# Patient Record
Sex: MALE | Race: WHITE | NOT HISPANIC OR LATINO
[De-identification: names, ages, dates, MRNs, and addresses within clinical notes are randomized per-mention and may not be internally consistent; named-entity substitution may affect disease eponyms.]

---

## 2023-01-24 ENCOUNTER — TRANSCRIBE ORDERS (OUTPATIENT)
Dept: OTHER | Age: 73
End: 2023-01-24

## 2023-01-24 DIAGNOSIS — Z98.890 S/P ARTHROSCOPY OF LEFT SHOULDER: ICD-10-CM

## 2023-01-24 DIAGNOSIS — M19.011 OSTEOARTHRITIS OF RIGHT GLENOHUMERAL JOINT: Primary | ICD-10-CM

## 2023-01-24 DIAGNOSIS — Z98.890 S/P ARTHROSCOPY OF RIGHT SHOULDER: ICD-10-CM

## 2023-02-09 ENCOUNTER — THERAPY VISIT (OUTPATIENT)
Dept: PHYSICAL THERAPY | Facility: CLINIC | Age: 73
End: 2023-02-09
Attending: ORTHOPAEDIC SURGERY
Payer: MEDICARE

## 2023-02-09 DIAGNOSIS — M25.511 CHRONIC RIGHT SHOULDER PAIN: ICD-10-CM

## 2023-02-09 DIAGNOSIS — G89.29 CHRONIC RIGHT SHOULDER PAIN: ICD-10-CM

## 2023-02-09 DIAGNOSIS — Z98.890 S/P ARTHROSCOPY OF RIGHT SHOULDER: ICD-10-CM

## 2023-02-09 DIAGNOSIS — Z98.890 S/P ARTHROSCOPY OF LEFT SHOULDER: ICD-10-CM

## 2023-02-09 DIAGNOSIS — M25.511 ACUTE PAIN OF RIGHT SHOULDER: ICD-10-CM

## 2023-02-09 DIAGNOSIS — M19.011 OSTEOARTHRITIS OF RIGHT GLENOHUMERAL JOINT: Primary | ICD-10-CM

## 2023-02-09 PROCEDURE — 97161 PT EVAL LOW COMPLEX 20 MIN: CPT | Mod: GP | Performed by: PHYSICAL THERAPIST

## 2023-02-09 PROCEDURE — 97110 THERAPEUTIC EXERCISES: CPT | Mod: GP | Performed by: PHYSICAL THERAPIST

## 2023-02-09 NOTE — PROGRESS NOTES
Physical Therapy Initial Evaluation  Subjective:  Pt reports he has been having some pain in his R shoulder over the last couple of months that was new to his R arm and he began to have more pain after he did some shoveling and roof raking. He went to see Dr. Garcia's team and had a cortisone injection at [facility] after his visit with Chidi Gonzáles on 11-10-22. He f/u with Dr. Garcia on 1-23-23 and he recommended 6 visits of therapy as well. PMH: R shoulder RCR in 2008 and L in 2012    The history is provided by the patient. No  was used.   Therapist Generated HPI Evaluation         Type of problem:  Right shoulder.    This is a new condition.  Condition occurred with:  Repetition/overuse and unknown cause.  Where condition occurred: for unknown reasons.  Patient reports pain:  Lateral, anterior and in the joint.  Pain is described as aching and is intermittent.  Pain radiates to:  Shoulder. Pain is worse in the A.M..  Since onset symptoms are gradually improving (since the injection).  Associated symptoms:  Loss of motion/stiffness, loss of strength and painful arc. Symptoms are exacerbated by carrying, lifting, lying on extremity, using arm behind back, using arm overhead and using arm at shoulder level  and relieved by rest, NSAID's and ice.  Special tests included:  X-ray.  Previous treatment includes surgery and physical therapy. There was significant improvement following previous treatment.  Barriers include:  None as reported by patient.    Patient Health History         Pain is reported as 2/10 on pain scale.    Pertinent medical history includes: history of fractures, osteoarthritis, smoking and osteoporosis (osteopenia, thumb and rib fractures, knees and hips replaced, ankle and femur fractures and compression fractures in spine).   Red flags:  None as reported by patient.   Other medical allergies details: cetadine, cephadrioxil, cephalexin, clindamiacin.   Surgeries include:   Orthopedic surgery. Other surgery history details: R REYES, TKA and L TKA and insufficiency fracture and had a REYES hip revision on R and 2 cuff repairs then had a L REYES in 2021.    Current medications: nexium, flowmax, finasteride, nasonex.    Current occupation is retired.                                       Objective:  Standing Alignment:      Shoulder/UE:  Protracted scapula R, elevated scapula R and scapular winging R                                       Shoulder Evaluation:  ROM:  AROM:    Flexion:  Left:  150    Right:  145 with pain 5/10    Abduction:  Left: 155   Right:  145 with 7/10 pain      External Rotation:  Left:  50    Right:  55 with pain 5/10            Extension/Internal Rotation:  Left:  T9    Right:  T10          Strength:  : R shoulder: 4/4-/4+/4- all with pain but ABD and ER were the worst (6/10) and L shouldeR: 4+/4+/5/4+                        Special Tests:      Right shoulder positive for the following special tests:Impingement  Palpation:    Left shoulder tenderness present at:  Upper Trap  Right shoulder tenderness present at: Levator; Rhomboids and Upper Trap  Mobility Tests:              Sternoclavicular right:  Hypomobile                                     General     ROS    Assessment/Plan:    Patient is a 72 year old male with right side shoulder complaints.    Patient has the following significant findings with corresponding treatment plan.                Diagnosis 1:  R shoulder impingement    Pain -  manual therapy, self management, education, directional preference exercise and home program  Decreased ROM/flexibility - manual therapy and therapeutic exercise  Decreased joint mobility - manual therapy and therapeutic exercise  Decreased strength - therapeutic exercise and therapeutic activities  Impaired muscle performance - neuro re-education  Decreased function - therapeutic activities    Therapy Evaluation Codes:     1) Clinical presentation characteristics  are:   Stable/Uncomplicated.  2) Decision-Making    Low complexity using standardized patient assessment instrument and/or measureable assessment of functional outcome.  Cumulative Therapy Evaluation is: Low complexity.    Previous and current functional limitations:  (See Goal Flow Sheet for this information)    Short term and Long term goals: (See Goal Flow Sheet for this information)     Communication ability:  Patient appears to be able to clearly communicate and understand verbal and written communication and follow directions correctly.  Treatment Explanation - The following has been discussed with the patient:   RX ordered/plan of care  Anticipated outcomes  Possible risks and side effects  This patient would benefit from PT intervention to resume normal activities.   Rehab potential is good.    Frequency:  2 X month, once daily  Duration:  for 3 months  Discharge Plan:  Achieve all LTG.  Independent in home treatment program.  Reach maximal therapeutic benefit.    Please refer to the daily flowsheet for treatment today, total treatment time and time spent performing 1:1 timed codes.

## 2023-02-09 NOTE — PROGRESS NOTES
[facility]    OUTPATIENT Physical Therapy ORTHOPEDIC EVALUATION  PLAN OF TREATMENT FOR OUTPATIENT REHABILITATION  (COMPLETE FOR INITIAL CLAIMS ONLY)  Patient's Last Name, First Name, M.I.  YOB: 1950  Javon Morris    Provider s Name:  [facility]   Medical Record No.  [id number]   Start of Care Date:  02/09/23   Onset Date:    (MD appt on 1-23-23)   Treatment Diagnosis:  R shoulder impingment Medical Diagnosis:     Osteoarthritis of right glenohumeral joint  S/P arthroscopy of right shoulder  S/P arthroscopy of left shoulder       Goals:     02/09/23 0500   Body Part   Goals listed below are for R shoulder   Goal #1   Goal #1 reaching   Previous Functional Level No restrictions   Current Functional Level Can reach ;behind back;overhead;out to the side;across body;to shoulder level   Performance level pain 5-7/10 pain at end range and painful arc   STG Target Performance Reach ;behind back;to counter height;to shoulder level;overhead;out to the side   Performance level no painful arc and pain 2-3/10 or less at end range   Rationale for independent personal hygiene;for dressing;for bathing;for safe driving, hook seat belt, reach shift lever and turn signal, using both hands on steering wheel   Due date 03/09/23   LTG Target Performance Reach;to counter height;to shoulder level;overhead;behind back;out to the side   Performance Level pain free at end range and no painful arc   Rationale for independent personal hygiene;for dressing;for meal preparation;for bathing;for safe driving, hook seat belt, reach shift lever and turn signal, using both hands on steering wheel   Due date 05/04/23         Therapy Frequency:  2x/month  Predicted Duration of Therapy Intervention:  3 months    Sonia Garcia DPT                 I CERTIFY THE NEED FOR THESE SERVICES  FURNISHED UNDER        THIS PLAN OF TREATMENT AND WHILE UNDER MY CARE .             Physician Signature               Date    X_____________________________________________________                         Certification Date From:  02/09/23   Certification Date To:  05/04/23    Referring Provider:  Kar Garcia    Initial Assessment        See Epic Evaluation SOC Date: 02/09/23

## 2023-03-17 ENCOUNTER — THERAPY VISIT (OUTPATIENT)
Dept: PHYSICAL THERAPY | Facility: CLINIC | Age: 73
End: 2023-03-17
Payer: MEDICARE

## 2023-03-17 DIAGNOSIS — M25.511 ACUTE PAIN OF RIGHT SHOULDER: Primary | ICD-10-CM

## 2023-03-17 PROCEDURE — 97112 NEUROMUSCULAR REEDUCATION: CPT | Mod: GP | Performed by: PHYSICAL THERAPIST

## 2023-03-17 PROCEDURE — 97110 THERAPEUTIC EXERCISES: CPT | Mod: GP | Performed by: PHYSICAL THERAPIST

## 2023-03-17 NOTE — PROGRESS NOTES
"Subjective:  HPI  Physical Exam                    Objective:  System    Physical Exam    General     ROS    Assessment/Plan:    SUBJECTIVE  Subjective changes as noted by pt:  He reports his shoulder is \"pretty good\" the exercises seemed to aggravate it at first but better now. If he lies on his R shoulder it seems worse at will wake him up. Generally though he is not waking at night.        Current pain level: 2/10     Changes in function:  Yes (See Goal flowsheet attached for changes in current functional level)     Adverse reaction to treatment or activity:  None    OBJECTIVE  Changes in objective findings:  Yes,     R shoulder pain at end range flexion, abd and ER all about 4/10      Extreme limitation in cervical extension but after ret/ext, his sx in flexion were abolished. Minor change with abd (3/10 and no change with end range ER    ASSESSMENT  Javon continues to require intervention to meet STG and LTG's: PT  Patient's symptoms are resolving.  Patient is progressing as expected.  Response to therapy has shown an improvement in  pain level, ROM  and flexibility  Progress made towards STG/LTG?  Yes (See Goal flowsheet attached for updates on achievement of STG and LTG)    PLAN  Current treatment program is being advanced to more complex exercises.    PTA/ATC plan:  N/A    Please refer to the daily flowsheet for treatment today, total treatment time and time spent performing 1:1 timed codes.        "

## 2023-03-29 ENCOUNTER — THERAPY VISIT (OUTPATIENT)
Dept: PHYSICAL THERAPY | Facility: CLINIC | Age: 73
End: 2023-03-29
Payer: MEDICARE

## 2023-03-29 DIAGNOSIS — M25.511 ACUTE PAIN OF RIGHT SHOULDER: Primary | ICD-10-CM

## 2023-03-29 PROCEDURE — 97110 THERAPEUTIC EXERCISES: CPT | Mod: GP | Performed by: PHYSICAL THERAPIST

## 2023-03-29 PROCEDURE — 97112 NEUROMUSCULAR REEDUCATION: CPT | Mod: GP | Performed by: PHYSICAL THERAPIST

## 2023-03-29 NOTE — PROGRESS NOTES
Subjective:  HPI  Physical Exam                    Objective:  System    Physical Exam    General     ROS    Assessment/Plan:    SUBJECTIVE  Subjective changes as noted by pt:  He has been pretty consistent with his neck exercise and is getting to it about 3 times a day. He reports he doesn't feel a lot different other than it's not as painful to do it.        Current pain level: 2/10     Changes in function:  Yes (See Goal flowsheet attached for changes in current functional level)     Adverse reaction to treatment or activity:  None    OBJECTIVE  Changes in objective findings:  Yes,     R shoulder pinching at end range flexion and abduction--was better with painful arc after repeated retraction with extension but no significant change of end range pain with either.             ASSESSMENT  Javon continues to require intervention to meet STG and LTG's: PT  Patient's symptoms are resolving.  Patient is progressing as expected.  Response to therapy has shown an improvement in  pain level, ROM  and flexibility  Progress made towards STG/LTG?  Yes (See Goal flowsheet attached for updates on achievement of STG and LTG)    PLAN  Current treatment program is being advanced to more complex exercises.    PTA/ATC plan:  N/A    Please refer to the daily flowsheet for treatment today, total treatment time and time spent performing 1:1 timed codes.

## 2023-04-17 ENCOUNTER — THERAPY VISIT (OUTPATIENT)
Dept: PHYSICAL THERAPY | Facility: CLINIC | Age: 73
End: 2023-04-17
Payer: MEDICARE

## 2023-04-17 DIAGNOSIS — M25.511 ACUTE PAIN OF RIGHT SHOULDER: Primary | ICD-10-CM

## 2023-04-17 PROCEDURE — 97110 THERAPEUTIC EXERCISES: CPT | Mod: GP | Performed by: PHYSICAL THERAPIST

## 2023-04-17 PROCEDURE — 97112 NEUROMUSCULAR REEDUCATION: CPT | Mod: GP | Performed by: PHYSICAL THERAPIST

## 2023-04-17 NOTE — PROGRESS NOTES
"Subjective:  HPI  Physical Exam                    Objective:  System    Physical Exam    General     ROS    Assessment/Plan:    SUBJECTIVE  Subjective changes as noted by pt:  He had the opportunity to go to [employer] golf but he chickened out because of how his shoulder was feeling. He does feel like it's a little better to sleep on his R side now and his \"toothache\" pain at night is gone. His sidelying flexion was really sore at first but he can do that now without pain.     Current pain level: 2/10     Changes in function:  Yes (See Goal flowsheet attached for changes in current functional level)     Adverse reaction to treatment or activity:  None    OBJECTIVE  Changes in objective findings:  Yes,     Pt reports dull ache in posterior and anterior shoulder after retraction/ext he reports no change.       ASSESSMENT  Javon continues to require intervention to meet STG and LTG's: PT  Patient's symptoms are resolving.  Patient is progressing as expected.  Response to therapy has shown an improvement in  pain level, ROM  and flexibility  Progress made towards STG/LTG?  Yes (See Goal flowsheet attached for updates on achievement of STG and LTG)    PLAN  Continue with HEP and progress to new exercises for strength today.      PTA/ATC plan:  N/A    Please refer to the daily flowsheet for treatment today, total treatment time and time spent performing 1:1 timed codes.        "

## 2023-05-01 ENCOUNTER — THERAPY VISIT (OUTPATIENT)
Dept: PHYSICAL THERAPY | Facility: CLINIC | Age: 73
End: 2023-05-01
Payer: MEDICARE

## 2023-05-01 DIAGNOSIS — M25.511 ACUTE PAIN OF RIGHT SHOULDER: Primary | ICD-10-CM

## 2023-05-01 PROCEDURE — 97110 THERAPEUTIC EXERCISES: CPT | Mod: GP | Performed by: PHYSICAL THERAPIST

## 2023-05-01 PROCEDURE — 97112 NEUROMUSCULAR REEDUCATION: CPT | Mod: GP | Performed by: PHYSICAL THERAPIST

## 2023-06-19 ENCOUNTER — THERAPY VISIT (OUTPATIENT)
Dept: PHYSICAL THERAPY | Facility: CLINIC | Age: 73
End: 2023-06-19
Payer: MEDICARE

## 2023-06-19 DIAGNOSIS — M25.511 ACUTE PAIN OF RIGHT SHOULDER: Primary | ICD-10-CM

## 2023-06-19 PROCEDURE — 97112 NEUROMUSCULAR REEDUCATION: CPT | Mod: GP | Performed by: PHYSICAL THERAPIST

## 2023-06-19 PROCEDURE — 97110 THERAPEUTIC EXERCISES: CPT | Mod: GP | Performed by: PHYSICAL THERAPIST

## 2023-06-19 NOTE — PROGRESS NOTES
[facility]                                                                                   OUTPATIENT PHYSICAL THERAPY    PLAN OF TREATMENT FOR OUTPATIENT REHABILITATION   Patient's Last Name, First Name, Javon Magaña YOB: 1950   Provider's Name   [facility]   Medical Record No.  [id number]     Onset Date: 01/23/23  Start of Care Date: 02/09/23     Medical Diagnosis:  R shoulder impingment      PT Treatment Diagnosis:  R shoulder pain Plan of Treatment  Frequency/Duration: 1x/month/ 3 months    Certification date from 05/05/23 to 09/11/23         See note for plan of treatment details and functional goals        06/19/23 0500   Appointment Info   Signing clinician's name / credentials Soina Garcia DPT,SCS   Total/Authorized Visits 6 (jose)   Visits Used 6   Medical Diagnosis R shoulder impingment   PT Tx Diagnosis R shoulder pain   Quick Adds Certification   Progress Note/Certification   Start of Care Date 02/09/23   Onset of illness/injury or Date of Surgery 01/23/23   Therapy Frequency 1x/month   Predicted Duration 3 months   Certification date from 05/05/23   Certification date to 09/11/23   PT Goal 1   Goal Identifier reaching   Goal Description Reach;to counter height;to shoulder level;overhead;behind back;out to the side pain free at end range and no painful arc   Rationale to maximize safety and independence with performance of ADLs and functional tasks;to maximize safety and independence within the home   Goal Progress pain now 2/10 at end range flexion and 4/10 with abd after cervical extension and painful arc is 2/10 with both   Target Date 09/11/23   Subjective Report   Subjective Report pt feels like he is still pretty functional but his sx are maybe a litlte worse as the injection is wearing off. He can still lift overhead and can lie on his R side but his L arm will go numb at night sometimes and he  has pain with lifting/reaching overhea dand across body.   Objective Measures   Objective Measures Objective Measure 1   Objective Measure 1   Objective Measure see notes, AROM full with ERP present with flex and abd on R   Treatment Interventions (PT)   Interventions Therapeutic Procedure/Exercise;Neuromuscular Re-education   Therapeutic Procedure/Exercise   PTRx Ther Proc 1 Cervical ROM Extension   PTRx Ther Proc 1 - Details 2 x 10 and felt decreased pain at rest (went from 5/10 to a 2-3/10 and even down to a 1/10) it reduced painful arc and ERP with flexion 2-3/10 vs 6-7/10 and abd 3-4/10 vs 7/10 but still ERP was the same   PTRx Ther Proc 2 Pendulum/Codmans   PTRx Ther Proc 2 - Details x 20 CW/CCW   PTRx Ther Proc 3 Reverse Pendulum Supine or Sidelying   PTRx Ther Proc 3 - Details x 20   PTRx Ther Proc 4 Supine Ceiling Punch   PTRx Ther Proc 4 - Details x 15   PTRx Ther Proc 5 Shoulder External Rotation Sidelying   PTRx Ther Proc 5 - Details rev for hep   PTRx Ther Proc 6 Shoulder Scaption Full Can   PTRx Ther Proc 6 - Details x15   PTRx Ther Proc 7 Shoulder Extension in Standing   PTRx Ther Proc 7 - Details x15 NMR for scap ret/dep   PTRx Ther Proc 8 Shoulder Flexion   PTRx Ther Proc 8 - Details rev   PTRx Ther Proc 9 Shoulder Theraband Rows   PTRx Ther Proc 9 - Details rev   Therapeutic Procedures: strength, endurance, ROM, flexibillity minutes (35917) 28   Patient Response/Progress vishnu well, no pain   Neuromuscular Re-education   PTRx Neuro Re-ed 1 Shoulder Theraband Low Row/[place]   PTRx Neuro Re-ed 1 - Details x15 blue band with NMR for scap dep   PTRx Neuro Re-ed 2 Push-Up Plus At Counter   PTRx Neuro Re-ed 2 - Details 2 x 10 with NMR for scap ret/dep   Neuromuscular re-ed of mvmt, balance, coord, kinesthetic sense, posture, proprioception minutes (62655) 10   Patient Response/Progress did well but push-ups did create sx that retraction took away--will try more neck ex in the next week and will attempt  these again if sx are better   Plan   Plan for next session add proprio   Total Session Time   Timed Code Treatment Minutes 38   Total Treatment Time (sum of timed and untimed services) 38     Sonia Garcia, [place]                        I CERTIFY THE NEED FOR THESE SERVICES FURNISHED UNDER        THIS PLAN OF TREATMENT AND WHILE UNDER MY CARE .             Physician Signature               Date    X_____________________________________________________                    Referring Provider:  Kar Garcia      Initial Assessment  See Epic Evaluation- Start of Care Date: 02/09/23

## 2023-07-26 ENCOUNTER — THERAPY VISIT (OUTPATIENT)
Dept: PHYSICAL THERAPY | Facility: CLINIC | Age: 73
End: 2023-07-26
Payer: MEDICARE

## 2023-07-26 DIAGNOSIS — M25.511 ACUTE PAIN OF RIGHT SHOULDER: Primary | ICD-10-CM

## 2023-07-26 PROCEDURE — 97112 NEUROMUSCULAR REEDUCATION: CPT | Mod: GP | Performed by: PHYSICAL THERAPIST

## 2023-07-26 PROCEDURE — 97110 THERAPEUTIC EXERCISES: CPT | Mod: GP | Performed by: PHYSICAL THERAPIST

## 2024-01-02 PROBLEM — M25.511 ACUTE PAIN OF RIGHT SHOULDER: Status: RESOLVED | Noted: 2023-02-09 | Resolved: 2024-01-02
